# Patient Record
Sex: MALE | Race: WHITE
[De-identification: names, ages, dates, MRNs, and addresses within clinical notes are randomized per-mention and may not be internally consistent; named-entity substitution may affect disease eponyms.]

---

## 2020-06-04 ENCOUNTER — HOSPITAL ENCOUNTER (EMERGENCY)
Dept: HOSPITAL 53 - M ED | Age: 25
Discharge: HOME | End: 2020-06-04
Payer: COMMERCIAL

## 2020-06-04 VITALS — BODY MASS INDEX: 32.81 KG/M2 | WEIGHT: 263.89 LBS | HEIGHT: 75 IN

## 2020-06-04 VITALS — SYSTOLIC BLOOD PRESSURE: 146 MMHG | DIASTOLIC BLOOD PRESSURE: 84 MMHG

## 2020-06-04 DIAGNOSIS — S61.411A: Primary | ICD-10-CM
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
## 2020-06-05 NOTE — REP
RIGHT HAND, FOUR VIEWS:

 

There is no evidence of an acute fracture, dislocation or intrinsic bone disease.

No radiopaque foreign body is seen in the soft tissues.

 

IMPRESSION:

 

No fracture or dislocation.  No radiopaque foreign body is seen in the soft

tissues.

 

 

Electronically Signed by

Himanshu Silva MD 06/08/2020 09:33 P

## 2020-06-13 ENCOUNTER — HOSPITAL ENCOUNTER (EMERGENCY)
Dept: HOSPITAL 53 - M ED | Age: 25
Discharge: HOME | End: 2020-06-13
Payer: COMMERCIAL

## 2020-06-13 VITALS — WEIGHT: 266.76 LBS | HEIGHT: 75 IN | BODY MASS INDEX: 33.17 KG/M2

## 2020-06-13 VITALS — DIASTOLIC BLOOD PRESSURE: 78 MMHG | SYSTOLIC BLOOD PRESSURE: 150 MMHG

## 2020-06-13 DIAGNOSIS — Z48.02: Primary | ICD-10-CM
